# Patient Record
Sex: FEMALE | Race: WHITE | Employment: OTHER | URBAN - METROPOLITAN AREA
[De-identification: names, ages, dates, MRNs, and addresses within clinical notes are randomized per-mention and may not be internally consistent; named-entity substitution may affect disease eponyms.]

---

## 2020-09-14 RX ORDER — ATORVASTATIN CALCIUM 10 MG/1
10 TABLET, FILM COATED ORAL DAILY
COMMUNITY

## 2020-09-14 RX ORDER — CALCIUM CARBONATE 500(1250)
500 TABLET ORAL DAILY
COMMUNITY

## 2020-09-14 RX ORDER — UBIDECARENONE 75 MG
50 CAPSULE ORAL DAILY
COMMUNITY

## 2020-09-14 RX ORDER — OMEGA-3S/DHA/EPA/FISH OIL/D3 300MG-1000
400 CAPSULE ORAL DAILY
COMMUNITY

## 2020-09-14 RX ORDER — M-VIT,TX,IRON,MINS/CALC/FOLIC 27MG-0.4MG
1 TABLET ORAL DAILY
COMMUNITY

## 2020-09-14 NOTE — PROGRESS NOTES
[address] [facility] time___1205_________        Surgery time___1335_________    Take the following medications with a sip of water:    Do not eat or drink anything after 12:00 midnight prior to your surgery. Clear liquids until 0800  This includes water chewing gum, mints and ice chips. You may brush your teeth and gargle the morning of your surgery, but do not swallow the water     Please see your family doctor/pediatrician for a history and physical and/or concerning medications. H&P Dr. Gato Olvera week of 9/7   Bring any test results/reports from your physicians office. If you are under the care of a heart doctor or specialist doctor, please be aware that you may be asked to them for clearance    You may be asked to stop blood thinners such as Coumadin, Plavix, Fragmin, Lovenox, etc., or any anti-inflammatories such as:  Aspirin, Ibuprofen, Advil, Naproxen prior to your surgery. We also ask that you stop any OTC medications such as fish oil, vitamin E, glucosamine, garlic, Multivitamins, COQ 10, etc.    We ask that you do not smoke 24 hours prior to surgery  We ask that you do not  drink any alcoholic beverages 24 hours prior to surgery     You must make arrangements for a responsible adult to take you home after your surgery. For your safety you will not be allowed to leave alone or drive yourself home. Your surgery will be cancelled if you do not have a ride home. Also for your safety, it is strongly suggested that someone stay with you the first 24 hours after your surgery. A parent or legal guardian must accompany a child scheduled for surgery and plan to stay at the hospital until the child is discharged. Please do not bring other children with you. For your comfort, please wear simple loose fitting clothing to the hospital.  Please do not bring valuables. Wear short sleeve button down shirt or loose fitting shirt. Bting eye drops or ointment. Do not wear any make-up or nail polish on your fingers or toes      For your safety, please do not wear any jewelry or body piercing's on the day of surgery. All jewelry must be removed. If you have dentures, they will be removed before going to operating room. For your convenience, we will provide you with a container. If you wear contact lenses or glasses, they will be removed, please bring a case for them. If you have a living will and a durable power of  for healthcare, please bring in a copy. As part of our patient safety program to minimize surgical site infections, we ask you to do the following:    · Please notify your surgeon if you develop any illness between         now and the  day of your surgery. · This includes a cough, cold, fever, sore throat, nausea,         or vomiting, and diarrhea, etc.  ·  Please notify your surgeon if you experience dizziness, shortness         of breath or blurred vision between now and the time of your surgery. Do not shave your operative site 96 hours prior to surgery. For face and neck surgery, men may use an electric razor 48 hours   prior to surgery. You may shower the night before surgery or the morning of   your surgery with an antibacterial soap. You will need to bring a photo ID and insurance card    [facility] has an onsite pharmacy, would you like to utilize our pharmacy     If you will be staying overnight and use a C-pap machine, please bring   your C-pap to hospital     Our goal is to provide you with excellent care, therefore, visitors will be limited to two(2) in the room at a time so that we may focus on providing this care for you. Please contact pre-admission testing if you have any further questions. [facility] phone number:  [phone]  Please note these are generalized instructions for all surgical cases, you may be provided with more specific instructions according to your surgery.

## 2020-09-18 ENCOUNTER — ANESTHESIA EVENT (OUTPATIENT)
Dept: SURGERY | Age: 70
End: 2020-09-18
Payer: MEDICARE

## 2020-09-18 NOTE — PROGRESS NOTES
[address]        Pre-Op Phone Call:     Patient Name: Srini Rodriguez     Telephone Information:   Mobile [phone]     Home phone:  [phone]     Surgery Time:   12:40 PM     Arrival Time:  1110     Left extended Message:  NA     Message left with:     Recent change in health status:  No     Advised of transportation/ policy:  Yes     NPO policy reviewed:  Yes clear liquids until 0800     Advised to take morning heart/blood pressure medications with sips of water morning of surgery? Yes     Instructed to bring eye drops, photo identification, and insurance card day of surgery? Yes     Advised to wear short sleeved button down shirt (no T-shirt underneath):  Yes     Advised not to wear jewelry, hairpins, or pantyhose day of surgery? Yes     Advised not to wear make-up and to wash face day of surgery?   Yes    Remarks:        Electronically signed by:  Yordan Adams RN at 9/18/2020 2:22 PM

## 2020-09-21 ENCOUNTER — ANESTHESIA (OUTPATIENT)
Dept: SURGERY | Age: 70
End: 2020-09-21
Payer: MEDICARE

## 2020-09-21 ENCOUNTER — HOSPITAL ENCOUNTER (OUTPATIENT)
Age: 70
Setting detail: OUTPATIENT SURGERY
Discharge: HOME | End: 2020-09-21
Attending: OPHTHALMOLOGY | Admitting: OPHTHALMOLOGY
Payer: MEDICARE

## 2020-09-21 VITALS
DIASTOLIC BLOOD PRESSURE: 65 MMHG | OXYGEN SATURATION: 99 % | HEART RATE: 88 BPM | SYSTOLIC BLOOD PRESSURE: 133 MMHG | HEIGHT: 60 IN | RESPIRATION RATE: 17 BRPM | BODY MASS INDEX: 38.28 KG/M2 | TEMPERATURE: 97.4 F | WEIGHT: 195 LBS

## 2020-09-21 VITALS — SYSTOLIC BLOOD PRESSURE: 134 MMHG | DIASTOLIC BLOOD PRESSURE: 63 MMHG | OXYGEN SATURATION: 100 %

## 2020-09-21 PROCEDURE — 3600000002 HC SURGERY LEVEL 2 BASE: Performed by: OPHTHALMOLOGY

## 2020-09-21 PROCEDURE — 2580000003 HC RX 258: Performed by: ANESTHESIOLOGY

## 2020-09-21 PROCEDURE — 3700000000 HC ANESTHESIA ATTENDED CARE: Performed by: OPHTHALMOLOGY

## 2020-09-21 PROCEDURE — 6370000000 HC RX 637 (ALT 250 FOR IP): Performed by: ANESTHESIOLOGY

## 2020-09-21 PROCEDURE — 2500000003 HC RX 250 WO HCPCS: Performed by: OPHTHALMOLOGY

## 2020-09-21 PROCEDURE — 3600000012 HC SURGERY LEVEL 2 ADDTL 15MIN: Performed by: OPHTHALMOLOGY

## 2020-09-21 PROCEDURE — 6360000002 HC RX W HCPCS: Performed by: NURSE ANESTHETIST, CERTIFIED REGISTERED

## 2020-09-21 PROCEDURE — 3700000001 HC ADD 15 MINUTES (ANESTHESIA): Performed by: OPHTHALMOLOGY

## 2020-09-21 PROCEDURE — 2709999900 HC NON-CHARGEABLE SUPPLY: Performed by: OPHTHALMOLOGY

## 2020-09-21 PROCEDURE — 2500000003 HC RX 250 WO HCPCS: Performed by: NURSE ANESTHETIST, CERTIFIED REGISTERED

## 2020-09-21 PROCEDURE — 7100000011 HC PHASE II RECOVERY - ADDTL 15 MIN: Performed by: OPHTHALMOLOGY

## 2020-09-21 PROCEDURE — 6370000000 HC RX 637 (ALT 250 FOR IP): Performed by: OPHTHALMOLOGY

## 2020-09-21 PROCEDURE — 7100000010 HC PHASE II RECOVERY - FIRST 15 MIN: Performed by: OPHTHALMOLOGY

## 2020-09-21 RX ORDER — SODIUM CHLORIDE 0.9 % (FLUSH) 0.9 %
10 SYRINGE (ML) INJECTION EVERY 12 HOURS SCHEDULED
Status: DISCONTINUED | OUTPATIENT
Start: 2020-09-21 | End: 2020-09-21 | Stop reason: HOSPADM

## 2020-09-21 RX ORDER — ERYTHROMYCIN 5 MG/G
OINTMENT OPHTHALMIC
Status: COMPLETED | OUTPATIENT
Start: 2020-09-21 | End: 2020-09-21

## 2020-09-21 RX ORDER — SODIUM CHLORIDE 9 MG/ML
INJECTION, SOLUTION INTRAVENOUS CONTINUOUS
Status: DISCONTINUED | OUTPATIENT
Start: 2020-09-21 | End: 2020-09-21 | Stop reason: HOSPADM

## 2020-09-21 RX ORDER — LIDOCAINE HYDROCHLORIDE 20 MG/ML
INJECTION, SOLUTION INFILTRATION; PERINEURAL PRN
Status: DISCONTINUED | OUTPATIENT
Start: 2020-09-21 | End: 2020-09-21 | Stop reason: SDUPTHER

## 2020-09-21 RX ORDER — SODIUM CHLORIDE 0.9 % (FLUSH) 0.9 %
10 SYRINGE (ML) INJECTION PRN
Status: DISCONTINUED | OUTPATIENT
Start: 2020-09-21 | End: 2020-09-21 | Stop reason: HOSPADM

## 2020-09-21 RX ORDER — MIDAZOLAM HYDROCHLORIDE 1 MG/ML
INJECTION INTRAMUSCULAR; INTRAVENOUS PRN
Status: DISCONTINUED | OUTPATIENT
Start: 2020-09-21 | End: 2020-09-21 | Stop reason: SDUPTHER

## 2020-09-21 RX ORDER — ONDANSETRON 2 MG/ML
INJECTION INTRAMUSCULAR; INTRAVENOUS PRN
Status: DISCONTINUED | OUTPATIENT
Start: 2020-09-21 | End: 2020-09-21 | Stop reason: SDUPTHER

## 2020-09-21 RX ORDER — ACETAMINOPHEN 500 MG
1000 TABLET ORAL ONCE
Status: COMPLETED | OUTPATIENT
Start: 2020-09-21 | End: 2020-09-21

## 2020-09-21 RX ORDER — TETRACAINE HYDROCHLORIDE 5 MG/ML
SOLUTION OPHTHALMIC
Status: COMPLETED | OUTPATIENT
Start: 2020-09-21 | End: 2020-09-21

## 2020-09-21 RX ORDER — FENTANYL CITRATE 50 UG/ML
INJECTION, SOLUTION INTRAMUSCULAR; INTRAVENOUS PRN
Status: DISCONTINUED | OUTPATIENT
Start: 2020-09-21 | End: 2020-09-21 | Stop reason: SDUPTHER

## 2020-09-21 RX ORDER — PROPOFOL 10 MG/ML
INJECTION, EMULSION INTRAVENOUS PRN
Status: DISCONTINUED | OUTPATIENT
Start: 2020-09-21 | End: 2020-09-21 | Stop reason: SDUPTHER

## 2020-09-21 RX ADMIN — SODIUM CHLORIDE: 9 INJECTION, SOLUTION INTRAVENOUS at 11:34

## 2020-09-21 RX ADMIN — ONDANSETRON 4 MG: 2 INJECTION INTRAMUSCULAR; INTRAVENOUS at 13:37

## 2020-09-21 RX ADMIN — FENTANYL CITRATE 50 MCG: 50 INJECTION INTRAMUSCULAR; INTRAVENOUS at 12:54

## 2020-09-21 RX ADMIN — ACETAMINOPHEN 1000 MG: 500 TABLET ORAL at 14:20

## 2020-09-21 RX ADMIN — LIDOCAINE HYDROCHLORIDE 50 MG: 20 INJECTION, SOLUTION INFILTRATION; PERINEURAL at 12:56

## 2020-09-21 RX ADMIN — PROPOFOL 70 MG: 10 INJECTION, EMULSION INTRAVENOUS at 12:56

## 2020-09-21 RX ADMIN — MIDAZOLAM 1 MG: 1 INJECTION INTRAMUSCULAR; INTRAVENOUS at 12:54

## 2020-09-21 ASSESSMENT — PULMONARY FUNCTION TESTS
PIF_VALUE: 0
PIF_VALUE: 1
PIF_VALUE: 0

## 2020-09-21 ASSESSMENT — PAIN - FUNCTIONAL ASSESSMENT: PAIN_FUNCTIONAL_ASSESSMENT: 0-10

## 2020-09-21 ASSESSMENT — PAIN SCALES - GENERAL
PAINLEVEL_OUTOF10: 3
PAINLEVEL_OUTOF10: 0

## 2020-09-21 ASSESSMENT — ENCOUNTER SYMPTOMS: SHORTNESS OF BREATH: 0

## 2020-09-21 NOTE — ANESTHESIA POSTPROCEDURE EVALUATION
Department of Anesthesiology  Postprocedure Note    Patient: Carlos Eduardo Huang  MRN: [id number]  YOB: 1950  Date of evaluation: 9/21/2020  Time:  1:43 PM     Procedure Summary     Date:  09/21/20 Room / Location:  [facility]    Anesthesia Start:  1250 Anesthesia Stop:  4175    Procedure:  BILATERAL UPPER LID BLEPHAROPLASTY (Bilateral Eye) Diagnosis:       Dermatochalasis of both upper eyelids      (Dermatochalasis of both upper eyelids)    Surgeon:  Yefri Del Cid MD Responsible Provider:  Faith Madden MD    Anesthesia Type:  MAC ASA Status:  2          Anesthesia Type: MAC    Eric Phase I: Eric Score: 10    Eric Phase II: Eric Score: 10    Last vitals: Reviewed and per EMR flowsheets. Anesthesia Post Evaluation    Patient location during evaluation: PACU  Level of consciousness: awake and alert  Airway patency: patent  Nausea & Vomiting: no nausea and no vomiting  Complications: no  Cardiovascular status: blood pressure returned to baseline  Respiratory status: acceptable  Hydration status: euvolemic  Comments: Postoperative Anesthesia Note    Name:    Carlos Eduardo Huang  MRN:      [id number]    Patient Vitals in the past 12 hrs:  09/21/20 1334, BP:139/60, Temp:97.4 °F (36.3 °C), Temp src:Temporal, Pulse:78, Resp:16, SpO2:98 %  09/21/20 1131, BP:(!) 155/59, Temp:97.1 °F (36.2 °C), Pulse:64, Resp:18, SpO2:100 %, Height:5' (1.524 m), Weight:195 lb (88.5 kg)     LABS:    CBC  No results found for: WBC, HGB, HCT, PLT  RENAL  No results found for: NA, K, CL, CO2, BUN, CREATININE, GLUCOSE  COAGS  No results found for: PROTIME, INR, APTT    Intake & Output:  @31PSAV@    Nausea & Vomiting:  No    Level of Consciousness:  Awake    Pain Assessment:  Adequate analgesia    Anesthesia Complications:  No apparent anesthetic complications    SUMMARY      Vital signs stable  OK to discharge from Stage I post anesthesia care.   Care transferred from Anesthesiology department on discharge from perioperative area

## 2020-09-21 NOTE — ANESTHESIA PRE PROCEDURE
[facility] Department of Anesthesiology  Pre-Anesthesia Evaluation/Consultation       Name:  Clarisa Gage  : 1950  Age:  79 y.o. MRN:  [id number]  Date: 2020           Surgeon: Surgeon(s):  Irving Sagastume MD    Procedure: Procedure(s):  BILATERAL UPPER LID BLEPHAROPLASTY     Allergies   Allergen Reactions    Penicillins Swelling     face     There is no problem list on file for this patient. Past Medical History:   Diagnosis Date    Hyperlipidemia     PONV (postoperative nausea and vomiting)      Past Surgical History:   Procedure Laterality Date    CHOLECYSTECTOMY      COLONOSCOPY      EYE SURGERY       Social History     Tobacco Use    Smoking status: Former Smoker    Smokeless tobacco: Never Used    Tobacco comment: quit 30 yrs ago   Substance Use Topics    Alcohol use: Not Currently    Drug use: Never     Medications  No current facility-administered medications on file prior to encounter.       Current Outpatient Medications on File Prior to Encounter   Medication Sig Dispense Refill    atorvastatin (LIPITOR) 10 MG tablet Take 10 mg by mouth daily      Multiple Vitamins-Minerals (THERAPEUTIC MULTIVITAMIN-MINERALS) tablet Take 1 tablet by mouth daily      vitamin D3 (CHOLECALCIFEROL) 10 MCG (400 UNIT) TABS tablet Take 400 Units by mouth daily      vitamin B-12 (CYANOCOBALAMIN) 100 MCG tablet Take 50 mcg by mouth daily      calcium carbonate (OSCAL) 500 MG TABS tablet Take 500 mg by mouth daily       Current Facility-Administered Medications   Medication Dose Route Frequency Provider Last Rate Last Dose    0.9 % sodium chloride infusion   Intravenous Continuous Wanda Andrew  mL/hr at 20 1134      sodium chloride flush 0.9 % injection 10 mL  10 mL Intravenous 2 times per day Wanda Andrew MD        sodium chloride flush 0.9 % injection 10 mL  10 mL Intravenous PRN Wanda Andrew MD         Vital Signs (Current) Vitals:    20 1413 20 1131   BP:  (!) 155/59   Pulse:  64   Resp:  18   Temp:  97.1 °F (36.2 °C)   SpO2:  100%   Weight: 195 lb (88.5 kg) 195 lb (88.5 kg)   Height: 5' (1.524 m) 5' (1.524 m)                                          BP Readings from Last 3 Encounters:   20 (!) 155/59     Vital Signs Statistics (for past 48 hrs)     Temp  Av.1 °F (36.2 °C)  Min: 97.1 °F (36.2 °C)   Min taken time: 20 1131  Max: 97.1 °F (36.2 °C)   Max taken time: 20 1131  Pulse  Av  Min: 59   Min taken time: 20 1131  Max: 59   Max taken time: 20 1131  Resp  Av  Min: 18   Min taken time: 20 1131  Max: 18   Max taken time: 20 1131  BP  Min: 155/59   Min taken time: 20 1131  Max: 155/59   Max taken time: 20 1131  SpO2  Av %  Min: 100 %   Min taken time: 20 1131  Max: 100 %   Max taken time: 20 1131  BP Readings from Last 3 Encounters:   20 (!) 155/59       BMI  Body mass index is 38.08 kg/m². Estimated body mass index is 38.08 kg/m² as calculated from the following:    Height as of this encounter: 5' (1.524 m). Weight as of this encounter: 195 lb (88.5 kg). CBC No results found for: WBC, RBC, HGB, HCT, MCV, RDW, PLT  CMP  No results found for: NA, K, CL, CO2, BUN, CREATININE, GFRAA, AGRATIO, LABGLOM, GLUCOSE, PROT, CALCIUM, BILITOT, ALKPHOS, AST, ALT  BMP  No results found for: NA, K, CL, CO2, BUN, CREATININE, CALCIUM, GFRAA, LABGLOM, GLUCOSE  POCGlucose  No results for input(s): GLUCOSE in the last 72 hours.    Coags  No results found for: PROTIME, INR, APTT  HCG (If Applicable) No results found for: PREGTESTUR, PREGSERUM, HCG, HCGQUANT   ABGs No results found for: PHART, PO2ART, KEX4MRY, ETV0MIM, BEART, V1QGNLRO   Type & Screen (If Applicable)  No results found for: LABABO, LABRH                         BMI: Wt Readings from Last 3 Encounters:       NPO Status:   Date of last liquid consumption: 20   Time of last liquid consumption: 0700(black coffee)   Date of last solid food consumption: 09/20/20      Time of last solid consumption: 1730       Anesthesia Evaluation  Patient summary reviewed   history of anesthetic complications: PONV. Airway: Mallampati: III  TM distance: >3 FB   Neck ROM: full  Mouth opening: > = 3 FB Dental:          Pulmonary:       (-) COPD, asthma and shortness of breath                           Cardiovascular:    (+) hyperlipidemia    (-) hypertension, valvular problems/murmurs, past MI, CAD, CABG/stent, dysrhythmias and  angina                Neuro/Psych:      (-) seizures, TIA and CVA           GI/Hepatic/Renal:        (-) GERD, PUD, liver disease and no renal disease       Endo/Other:        (-) diabetes mellitus               Abdominal:           Vascular: negative vascular ROS. Anesthesia Plan      MAC     ASA 2     (I discussed local anesthesia with intravenous sedation to the   patient's satisfaction and agreed including risks and alternatives. The  patient has no further questions. HARRY PATEL )  Induction: intravenous. Anesthetic plan and risks discussed with patient. Plan discussed with CRNA. This pre-anesthesia assessment may be used as a history and physical.    DOS STAFF ADDENDUM:    Pt seen and examined, chart reviewed (including anesthesia, drug and allergy history). No interval changes to history and physical examination. Anesthetic plan, risks, benefits, alternatives, and personnel involved discussed with patient. Patient verbalized an understanding and agrees to proceed.       Josué Courtney MD  September 21, 2020  11:47 AM

## 2020-09-21 NOTE — PROGRESS NOTES
Pt was experiencing nausea upon arrival to PACU. Zofran 4mg was given and helped slightly 30 minutes after administration. After reviewing discharge instructions with patient and daughter patient stated nausea persisted and wanted to sit on the side of the bed. This positioning was helpful and after using the restroom the patients stomach discomfort and nausea subsided. A headache type discomfort of 2-3/10 was stated by patient and 1000mg tylenol given at 1420. Patient stated comfortable to be discharged. Ice pack given to patient for car ride home.

## 2020-09-21 NOTE — OP NOTE
Title of Operation:  Bilateral upper lid functional blepharoplasty, CPT code 15837-89  Indications for Surgery:  79year-old female who presented visually significant bilateral upper lid dermatochalasis, for which medically necessary blepharoplasty was indicated. After benefits, risks and alternatives were discussed, the patient elected to proceed with surgical repair. Preoperative Diagnosis:  Bilateral upper lid dermatochalasis  Postoperative Diagnosis:  Bilateral upper lid dermatochalasis  Anesthesia:   Monitored anesthesia care (MAC) and Local.  Specimen (Bacteriological, Pathological or other):  None  Prosthetic Device/Implant:  None  Surgeon:  Brianna Amos MD  Assistant:  None  Estimated blood loss:  Less than 5mL  Surgeons Narrative: The patient was greeted in the preoperative area. The correct place for surgery was identified and marked. The patient was taken back to the operating room and placed in supine position. Time-out for safety was held. The upper eyelid crease and an ellipse of upper lid skin were measured and marked on each side with a marking pen. Intravenous sedation was administered. A 50:50 mix of 2% lidocaine with 1:100,000 epinephrine and 0.75% marcaine was injected in these areas for local anesthesia. The patient was then prepped and draped in the usual sterile fashion. The right upper lid was addressed first. The skin was incised with a #15 blade. The strip of excess skin was removed with Taylor scissors. Careful hemostasis was achieved with bipolar cautery. The orbital septum was incised, the preaponeurotic fat pads were identified and trimmed as needed, with a clamp/cut/cautery technique. The skin was then closed with a running 7-0 Prolene suture. The exact same procedure was performed in the left upper lid. Antibiotic ointment was applied in the eyes and on the incision sites. The patient tolerated the procedure very well. There were no complications.

## (undated) DEVICE — GAUZE SPONGES,12 PLY: Brand: CURITY

## (undated) DEVICE — GLOVE SURG SZ 65 L12IN FNGR THK87MIL WHT LTX FREE

## (undated) DEVICE — COVER LT HNDL BLU PLAS

## (undated) DEVICE — RESTRAINT LIMB QUIK RELEASE CLOSURE WRST DETACHABLE WSH LF

## (undated) DEVICE — SUTURE PROL 7-0 L18IN NONABSORBABLE BLU P-1 L11MM 3/8 CIR 8696G

## (undated) DEVICE — INTENDED FOR TISSUE SEPARATION, AND OTHER PROCEDURES THAT REQUIRE A SHARP SURGICAL BLADE TO PUNCTURE OR CUT.: Brand: BARD-PARKER ® STAINLESS STEEL BLADES

## (undated) DEVICE — SOLUTION IV IRRIG 250ML ST LF 0.9% SODIUM 2F7122

## (undated) DEVICE — Z DISCONTINUED USE 2131664 WIPE INSTR W3XL3IN NONLINTING

## (undated) DEVICE — SOLUTION IRRIG 250ML STRL H2O PLAS POUR BTL USP

## (undated) DEVICE — 6 ML SYRINGE LUER-LOCK TIP: Brand: MONOJECT

## (undated) DEVICE — NEEDLE HYPO 30GA L0.5IN BGE POLYPR HUB S STL REG BVL STR

## (undated) DEVICE — APPLICATOR,COTTON-TIP,WOOD,3,STRL: Brand: MEDLINE

## (undated) DEVICE — INSTRUMENT COVER: Brand: CONVERTORS

## (undated) DEVICE — ENT I-LF: Brand: MEDLINE INDUSTRIES, INC.